# Patient Record
Sex: FEMALE | Race: BLACK OR AFRICAN AMERICAN | NOT HISPANIC OR LATINO | Employment: OTHER
[De-identification: names, ages, dates, MRNs, and addresses within clinical notes are randomized per-mention and may not be internally consistent; named-entity substitution may affect disease eponyms.]

---

## 2017-01-05 ENCOUNTER — SURGERY (OUTPATIENT)
Age: 82
End: 2017-01-05

## 2017-03-09 ENCOUNTER — SURGERY (OUTPATIENT)
Age: 82
End: 2017-03-09

## 2017-03-28 ENCOUNTER — SURGERY (OUTPATIENT)
Age: 82
End: 2017-03-28

## 2017-06-01 ENCOUNTER — SURGERY (OUTPATIENT)
Age: 82
End: 2017-06-01

## 2017-06-01 ENCOUNTER — HOSPITAL ENCOUNTER (OUTPATIENT)
Facility: OTHER | Age: 82
Discharge: HOME | End: 2017-06-01
Attending: INTERNAL MEDICINE | Admitting: INTERNAL MEDICINE
Payer: MEDICARE

## 2017-06-01 VITALS
TEMPERATURE: 98 F | HEIGHT: 59 IN | HEART RATE: 64 BPM | BODY MASS INDEX: 30.64 KG/M2 | SYSTOLIC BLOOD PRESSURE: 123 MMHG | RESPIRATION RATE: 16 BRPM | WEIGHT: 152 LBS | OXYGEN SATURATION: 97 % | DIASTOLIC BLOOD PRESSURE: 56 MMHG

## 2017-06-01 DIAGNOSIS — T82.599A MECHANICAL COMPLICATION OF VASCULAR DEVICE, INITIAL ENCOUNTER: ICD-10-CM

## 2017-06-01 DIAGNOSIS — T82.599A MECHANICAL COMPLICATION OF VASCULAR DEVICE: ICD-10-CM

## 2017-06-01 LAB — POCT GLUCOSE: 167 MG/DL (ref 70–110)

## 2017-06-01 PROCEDURE — C1894 INTRO/SHEATH, NON-LASER: HCPCS | Performed by: INTERNAL MEDICINE

## 2017-06-01 PROCEDURE — 27201059 HC S ENCORE INFLATION DEVICE: Performed by: INTERNAL MEDICINE

## 2017-06-01 PROCEDURE — 25000003 PHARM REV CODE 250: Performed by: INTERNAL MEDICINE

## 2017-06-01 PROCEDURE — C1769 GUIDE WIRE: HCPCS | Performed by: INTERNAL MEDICINE

## 2017-06-01 PROCEDURE — 99153 MOD SED SAME PHYS/QHP EA: CPT | Performed by: INTERNAL MEDICINE

## 2017-06-01 PROCEDURE — 25000003 PHARM REV CODE 250

## 2017-06-01 PROCEDURE — 63600175 PHARM REV CODE 636 W HCPCS: Performed by: INTERNAL MEDICINE

## 2017-06-01 PROCEDURE — 25500020 PHARM REV CODE 255

## 2017-06-01 PROCEDURE — 63600175 PHARM REV CODE 636 W HCPCS

## 2017-06-01 PROCEDURE — C1725 CATH, TRANSLUMIN NON-LASER: HCPCS | Performed by: INTERNAL MEDICINE

## 2017-06-01 PROCEDURE — C1757 CATH, THROMBECTOMY/EMBOLECT: HCPCS | Performed by: INTERNAL MEDICINE

## 2017-06-01 PROCEDURE — 99152 MOD SED SAME PHYS/QHP 5/>YRS: CPT | Performed by: INTERNAL MEDICINE

## 2017-06-01 PROCEDURE — 36905 THRMBC/NFS DIALYSIS CIRCUIT: CPT | Performed by: INTERNAL MEDICINE

## 2017-06-01 PROCEDURE — 27201224 HC CATH ANGIOGRAM: Performed by: INTERNAL MEDICINE

## 2017-06-01 RX ORDER — LIDOCAINE HYDROCHLORIDE 10 MG/ML
1 INJECTION, SOLUTION EPIDURAL; INFILTRATION; INTRACAUDAL; PERINEURAL ONCE
Status: COMPLETED | OUTPATIENT
Start: 2017-06-01 | End: 2017-06-01

## 2017-06-01 RX ORDER — MIDAZOLAM HYDROCHLORIDE 1 MG/ML
INJECTION, SOLUTION INTRAMUSCULAR; INTRAVENOUS
Status: DISCONTINUED | OUTPATIENT
Start: 2017-06-01 | End: 2017-06-01 | Stop reason: HOSPADM

## 2017-06-01 RX ORDER — FENTANYL CITRATE 50 UG/ML
INJECTION, SOLUTION INTRAMUSCULAR; INTRAVENOUS
Status: DISCONTINUED | OUTPATIENT
Start: 2017-06-01 | End: 2017-06-01 | Stop reason: HOSPADM

## 2017-06-01 RX ORDER — HEPARIN SODIUM 1000 [USP'U]/ML
INJECTION, SOLUTION INTRAVENOUS; SUBCUTANEOUS
Status: DISCONTINUED | OUTPATIENT
Start: 2017-06-01 | End: 2017-06-01 | Stop reason: HOSPADM

## 2017-06-01 RX ORDER — HEPARIN SOD,PORCINE/0.9 % NACL 1000/500ML
INTRAVENOUS SOLUTION INTRAVENOUS
Status: DISCONTINUED | OUTPATIENT
Start: 2017-06-01 | End: 2017-06-01 | Stop reason: HOSPADM

## 2017-06-01 RX ORDER — GLIPIZIDE 10 MG/1
10 TABLET, FILM COATED, EXTENDED RELEASE ORAL
COMMUNITY

## 2017-06-01 RX ADMIN — FENTANYL CITRATE 50 MCG: 50 INJECTION, SOLUTION INTRAMUSCULAR; INTRAVENOUS at 02:06

## 2017-06-01 RX ADMIN — LIDOCAINE HYDROCHLORIDE 5 ML: 10 INJECTION, SOLUTION EPIDURAL; INFILTRATION; INTRACAUDAL; PERINEURAL at 02:06

## 2017-06-01 RX ADMIN — ALTEPLASE 2 MG: 2.2 INJECTION, POWDER, LYOPHILIZED, FOR SOLUTION INTRAVENOUS at 01:06

## 2017-06-01 RX ADMIN — MIDAZOLAM HYDROCHLORIDE 1 MG: 1 INJECTION, SOLUTION INTRAMUSCULAR; INTRAVENOUS at 01:06

## 2017-06-01 RX ADMIN — HEPARIN SODIUM IN SODIUM CHLORIDE 500 ML: 200 INJECTION INTRAVENOUS at 01:06

## 2017-06-01 RX ADMIN — MIDAZOLAM HYDROCHLORIDE 1 MG: 1 INJECTION, SOLUTION INTRAMUSCULAR; INTRAVENOUS at 02:06

## 2017-06-01 RX ADMIN — HEPARIN SODIUM 5000 UNITS: 1000 INJECTION, SOLUTION INTRAVENOUS; SUBCUTANEOUS at 02:06

## 2017-06-01 RX ADMIN — FENTANYL CITRATE 50 MCG: 50 INJECTION, SOLUTION INTRAMUSCULAR; INTRAVENOUS at 01:06

## 2017-06-01 NOTE — PLAN OF CARE
Patient prefers to have son Jeff Pimentel present for discharge teaching. Please contact them @[phone].

## 2017-06-01 NOTE — PLAN OF CARE
Binta JONES Loren has met all discharge criteria from Phase II. Vital Signs are stable, ambulating  without difficulty. Discharge instructions given, patient verbalized understanding. Discharged from facility via wheelchair in stable condition.

## 2017-06-01 NOTE — DISCHARGE INSTRUCTIONS
NO HEAVY LIFTING FOR 72 HOURS      After the procedure:    · Your arm and hand will be checked to make sure blood is flowing through the fistula properly. The feeling of blood rushing through the fistula is called a thrill. It is somewhat similar to the purring of a cat. Youll be taught how to check for this feeling each day to make sure there are no problems with your fistula.   · Watch for bleeding-If bleeding occurs place light pressure on the area and call your physician.    Recovering at Home  Once at home, follow all of the instructions youve been given. Be sure to:  · Take all medications as directed.  · Care for your incision as instructed.  · Check for signs of infection at the incision site (see below).  · Avoid heavy lifting and strenuous activities as directed.  · Monitor and care for your fistula as instructed      Anesthesia: Monitored Anesthesia Care (MAC)    Anesthesia Safety  · Have an adult family member or friend drive you home after the procedure.  · For the first 24 hours after your surgery:  ¨ Do not drive or use heavy equipment.  ¨ Do not make important decisions or sign documents.  ¨ Avoid alcohol.  ¨ Have someone stay with you, if possible. They can watch for problems and help keep you safe.    PLEASE FOLLOW ANY OTHER INSTRUCTIONS PROVIDED TO YOU BY DR. POSADA!

## 2017-06-01 NOTE — H&P
[facility]  History & Physical    Subjective:      Chief Complaint/Reason for Admission: Declot of LUE AVF.    Binta Pimentel is a 83 y.o. female with ESRD (MWF at [facility] with Dr. Gomes), comes in with clotted access.  She had superficialization with Dr. Marroquin at the end of January.    Past Medical History:   Diagnosis Date    Diabetes mellitus     Hypertension     Kidney disease      History reviewed. No pertinent surgical history.  History reviewed. No pertinent family history.  Social History   Substance Use Topics    Smoking status: Never Smoker    Smokeless tobacco: Not on file    Alcohol use Not on file       PTA Medications   Medication Sig    furosemide (LASIX) 20 MG tablet Take 20 mg by mouth 2 (two) times daily.    glipiZIDE (GLUCOTROL) 10 MG TR24 Take 10 mg by mouth daily with breakfast.    hydrochlorothiazide (MICROZIDE) 12.5 mg capsule Take 12.5 mg by mouth once daily.    labetalol (NORMODYNE) 300 MG tablet Take 300 mg by mouth 2 (two) times daily.    lisinopril (PRINIVIL,ZESTRIL) 40 MG tablet Take 40 mg by mouth once daily.    simvastatin (ZOCOR) 40 MG tablet Take 40 mg by mouth every evening.     Review of patient's allergies indicates:  No Known Allergies     Review of Systems   Constitutional: Negative.    Respiratory: Negative.    Cardiovascular: Negative.        Objective:      Vital Signs (Most Recent)  Temp: 98.1 °F (36.7 °C) (06/01/17 1019)  Pulse: 65 (06/01/17 1019)  Resp: 16 (06/01/17 1019)  BP: 124/60 (06/01/17 1019)  SpO2: 97 % (06/01/17 1019)    Vital Signs Range (Last 24H):  Temp:  [98.1 °F (36.7 °C)]   Pulse:  [65]   Resp:  [16]   BP: (124)/(60)   SpO2:  [97 %]     Physical Exam   Constitutional: She is oriented to person, place, and time. She appears well-developed and well-nourished. No distress.   Pulmonary/Chest: Effort normal. No respiratory distress.   Musculoskeletal:   LUE AVF with graft portion, no thrill.  L radial pulse diminished.    Neurological: She is alert and oriented to person, place, and time.   Skin: She is not diaphoretic.   Psychiatric: She has a normal mood and affect. Her behavior is normal.         Assessment:      Active Hospital Problems    Diagnosis  POA    Mechanical complication of vascular device [T89.470M]  Yes      Resolved Hospital Problems    Diagnosis Date Resolved POA   No resolved problems to display.       Plan:    Declot today.

## 2017-06-01 NOTE — BRIEF OP NOTE
[facility]  Brief Operative Note     SUMMARY     Surgery Date: 6/1/2017     Surgeon(s) and Role:     * Dereck Wilson MD - Primary    Assisting Surgeon: Neema Munoz MD; Karlos Jackson MD    Pre-op Diagnosis:  ESRD (end stage renal disease) on dialysis [N18.6, Z99.2]    Post-op Diagnosis:  Post-Op Diagnosis Codes:     * ESRD (end stage renal disease) on dialysis [N18.6, Z99.2]    Procedure(s) (LRB):  DECLOT- AV FISTULA/GRAFT (N/A)    Anesthesia: 1% lidocaine, 2mg versed, 100mcg fentanyl    Description of the findings of the procedure: Patient was prepped and draped in a sterile fashion.  This was a successful declot of her LUE AV fistula although please see note for full details and recommendations.  Patient tolerated the procedure well with no immediate complications, although she did develop a small hematoma.    Findings/Key Components: Patient was prepped and draped in a sterile fashion.  This was a successful declot of her LUE AV fistula although please see note for full details and recommendations.  Patient tolerated the procedure well with no immediate complications, although she did develop a small hematoma.    Estimated Blood Loss: 20ml         Specimens:   Specimen (12h ago through future)    None          Discharge Note    SUMMARY     Admit Date: 6/1/2017    Discharge Date and Time:  06/01/2017 2:50 PM    Hospital Course (synopsis of major diagnoses, care, treatment, and services provided during the course of the hospital stay): Patient was prepped and draped in a sterile fashion.  This was a successful declot of her LUE AV fistula although please see note for full details and recommendations.  Patient tolerated the procedure well with no immediate complications, although she did develop a small hematoma.       Final Diagnosis: Post-Op Diagnosis Codes:     * ESRD (end stage renal disease) on dialysis [N18.6, Z99.2]    Disposition: Home or Self Care    Follow Up/Patient Instructions:      Medications:  Reconciled Home Medications:   Current Discharge Medication List      CONTINUE these medications which have NOT CHANGED    Details   furosemide (LASIX) 20 MG tablet Take 20 mg by mouth 2 (two) times daily.      glipiZIDE (GLUCOTROL) 10 MG TR24 Take 10 mg by mouth daily with breakfast.      hydrochlorothiazide (MICROZIDE) 12.5 mg capsule Take 12.5 mg by mouth once daily.      labetalol (NORMODYNE) 300 MG tablet Take 300 mg by mouth 2 (two) times daily.      lisinopril (PRINIVIL,ZESTRIL) 40 MG tablet Take 40 mg by mouth once daily.      simvastatin (ZOCOR) 40 MG tablet Take 40 mg by mouth every evening.             Discharge Procedure Orders  Activity as tolerated     Lifting restrictions   Order Comments: No heavy lifting for 72 hours.     Call MD for:  temperature >100.4     Call MD for:  severe uncontrolled pain     Call MD for:  redness, tenderness, or signs of infection (pain, swelling, redness, odor or green/yellow discharge around incision site)     Call MD for:   Order Comments: Bleeding from the access site.       Follow-up Information     Go to [facility].    Why:  For dialysis  Contact information:  [address]  [facility] [address] [phone]

## 2017-06-01 NOTE — PROCEDURES
hospitals Interventional Nephrology Procedure Report    Date of Procedure: 06/01/2017     :  Dereck Wilson MD  Assistant: Neema Munoz MD; Karlos Jackson MD     Indication for Procedure:  Thrombosed LUE brachiocephalic AV fistula      Referring Provider:  [facility] dialysis unit and Dr. Gomes     Procedures Performed:  1.  Access cannulation  2.  Second cannulation  3.  Venous administration of TPA  4.  Venous angioplasty  5.  Percutaneous thrombectomy  6.  Arteriogram  7.  Moderate conscious sedation     Medications Administered:  1.  2 mg of tPA IV  2.  Heparin 5000 units IV  3.  Versed 2 mg IV  4.  Fentanyl 100 mcg IV     Blood Loss:  Approximately 20 mL     Contrast Used:  Approximately 27 mL     Procedure in Detail:    After informed consent was obtained, the patient was prepped and draped in the usual sterile fashion.  Her LUE brachiocephalic AV fistula was cannulated in the venous facing direction with a micropuncture set.  The microwire was confirmed to be in correct location under fluoroscopy and a 4-[place] micro sheath was established.  A glidewire was advanced easily to the central circulation and the micro sheath was removed.  A 6-[place] sheath was established.  Then, an over-the-wire Berenstein guiding catheter was advanced to the central circulation and a central venogram was performed, which revealed patent central vasculature.  After the patient was reevaluated with the physician, moderate sedation was then administered.  A pullback venogram was performed, which revealed stenosis beginning at the distal portion of her cephalic arch.  So, the wire was reinserted and the Berenstein catheter was removed.  Then, 2 mg of TPA was injected into the venous facing sheath.  Following this, a 7x80 mm Conquest PTA balloon was advanced to the cephalic arch and was then inflated to full effacement with the waist seen on the balloon prior to full effacement.  Angioplasty was repeated  back within the   graft to macerate the thrombus.  The balloon was then removed and a second cannulation was made in the arterial facing direction with the micropuncture set.  Again, the microwire confirmed correct location under fluoroscopy.  Microsheath established and a glidewire was advanced across the arterial anastomosis and down into the radial artery and then the micro sheath was removed and a 6-Citizen of [place] sheath was established.  Then using an over-the-wire technique, the Alexia catheter was readvanced across the arterial anastomosis, inflated and pullback maneuver was performed with significant thrombus aspirated through the arterial facing   sheath.  This procedure was repeated approximately 5 times after which there was noted to be weak pulsatility of the AV fistula.  We then took the Alexia catheter on the venous facing wire and swept towards the central circulation to sweep out any residual thrombus and there was still weak pulsatility flow felt up the AV fistula.  So at this point, the Fogerty catheter advanced across the arterial anastomosis and an arteriogram was performed.  At least 6 cm of the brachial artery was seen passing down into the forearm and passed the bifurcation into the radial and ulnar arteries and no distal emboli were seen.      The arterial facing guidewire and sheath were then removed and hemostasis was achieved using a #2 Ethilon suture.  We then administered 5000 units of IV heparin and a repeat fistulogram was performed, which revealed brisk flow through the lower portion of the AV fistula with some residual significant stenosis seen near the venous-facing sheath, so the 7x80 mm conquest PTA balloon was advanced over the wire to this proximal venous fistular lesion and the balloon was inflated to full effacement with waist seen.  So at this point, we reevaluated the patient again as she was having some pain and an additional 1 mg of Versed and 50 mcg of fentanyl were  administered.  Repeat angiogram following the angioplasty revealed some improvement in the outflow stenosis and minimal extravasation of contrast seen.  We readvanced the same balloon to the site of extravasation and inflated to nominal pressure (12atm) and held for 30 seconds.  A follow up venogram revealed no further extravasation.  Hemostasis was achieved using a #2 Ethilon suture after the venous-facing sheath was pulled.  After the procedure, it was determined with ultrasound that a small (3x5cm) hematoma developed around the proximal portion of the fistula, which remained stable in size.  We additionally used doppler/pulse wave to determine the blood flow rate to be less than 200ml/min.     IMPRESSION AND PLAN:  This is a successful percutaneous thrombectomy using   mechanical and pharmacologic thrombolysis.  We will have the patient return in 1   week for suture removal and a followup ultrasound.      We would recommend holding off from using this fistula for 2 weeks (and to use the tunneled catheter she has in place), and allow the small hematoma to resolve and potentially for the blood flow through the AV fistula to improve.  As is, the flow which we were able to reestablish was less than ideal, but do expect this to improve some as the flow through the venous side continues and the hematoma resolved.      Dereck Wilson MD  [phone]

## 2017-06-08 ENCOUNTER — SURGERY (OUTPATIENT)
Age: 82
End: 2017-06-08

## 2017-06-12 NOTE — PLAN OF CARE
06/12/17 1613   ED Admissions Case Approval   ED Admissions Case Approval (!) CM Approved  (OP )

## 2017-06-13 ENCOUNTER — ANESTHESIA EVENT (OUTPATIENT)
Dept: SURGERY | Facility: OTHER | Age: 82
End: 2017-06-13
Payer: MEDICARE

## 2017-06-13 ENCOUNTER — HOSPITAL ENCOUNTER (OUTPATIENT)
Dept: PREADMISSION TESTING | Facility: OTHER | Age: 82
Discharge: HOME | End: 2017-06-13
Attending: SPECIALIST
Payer: MEDICARE

## 2017-06-13 VITALS
WEIGHT: 156 LBS | SYSTOLIC BLOOD PRESSURE: 125 MMHG | BODY MASS INDEX: 31.45 KG/M2 | HEART RATE: 65 BPM | OXYGEN SATURATION: 95 % | HEIGHT: 59 IN | TEMPERATURE: 98 F | DIASTOLIC BLOOD PRESSURE: 61 MMHG

## 2017-06-13 DIAGNOSIS — Z01.818 PREOP TESTING: ICD-10-CM

## 2017-06-13 LAB
ANION GAP SERPL CALC-SCNC: 16 MMOL/L
BUN SERPL-MCNC: 31 MG/DL
CALCIUM SERPL-MCNC: 8.9 MG/DL
CHLORIDE SERPL-SCNC: 97 MMOL/L
CO2 SERPL-SCNC: 25 MMOL/L
CREAT SERPL-MCNC: 4.1 MG/DL
EST. GFR  (AFRICAN AMERICAN): 11 ML/MIN/1.73 M^2
EST. GFR  (NON AFRICAN AMERICAN): 9 ML/MIN/1.73 M^2
GLUCOSE SERPL-MCNC: 236 MG/DL
HCT VFR BLD AUTO: 32.5 %
HGB BLD-MCNC: 10.9 G/DL
POTASSIUM SERPL-SCNC: 3.3 MMOL/L
SODIUM SERPL-SCNC: 138 MMOL/L

## 2017-06-13 PROCEDURE — 93010 ELECTROCARDIOGRAM REPORT: CPT | Mod: ,,, | Performed by: INTERNAL MEDICINE

## 2017-06-13 PROCEDURE — 36415 COLL VENOUS BLD VENIPUNCTURE: CPT

## 2017-06-13 PROCEDURE — 85018 HEMOGLOBIN: CPT

## 2017-06-13 PROCEDURE — 85014 HEMATOCRIT: CPT

## 2017-06-13 PROCEDURE — 93005 ELECTROCARDIOGRAM TRACING: CPT

## 2017-06-13 PROCEDURE — 80048 BASIC METABOLIC PNL TOTAL CA: CPT

## 2017-06-13 RX ORDER — NIFEDIPINE 60 MG/1
60 TABLET, EXTENDED RELEASE ORAL 2 TIMES DAILY
COMMUNITY

## 2017-06-13 RX ORDER — ERGOCALCIFEROL 1.25 MG/1
50000 CAPSULE ORAL
COMMUNITY

## 2017-06-13 RX ORDER — CINACALCET 30 MG/1
30 TABLET, FILM COATED ORAL
COMMUNITY

## 2017-06-13 RX ORDER — SODIUM CHLORIDE, SODIUM LACTATE, POTASSIUM CHLORIDE, CALCIUM CHLORIDE 600; 310; 30; 20 MG/100ML; MG/100ML; MG/100ML; MG/100ML
INJECTION, SOLUTION INTRAVENOUS CONTINUOUS
Status: CANCELLED | OUTPATIENT
Start: 2017-06-13

## 2017-06-13 NOTE — DISCHARGE INSTRUCTIONS
PRE-ADMIT TESTING -  [phone]    [address]  [facility]        OUTPATIENT SURGERY UNIT - [phone]    Your surgery has been scheduled at [facility]. We are pleased to have the opportunity to serve you. For Further Information please call [phone].    On the day of surgery please report to the Information Desk on the 1st floor.    · CONTACT YOUR PHYSICIAN'S OFFICE THE DAY PRIOR TO YOUR SURGERY TO OBTAIN YOUR ARRIVAL TIME.     · The evening before surgery do not eat anything after 9 p.m. ( this includes hard candy, chewing gum and mints).  You may only have GATORADE, POWERADE AND WATER  from 9 p.m. until you leave your home.   DO NOT DRINK ANY LIQUIDS ON THE WAY TO THE HOSPITAL.      SPECIAL MEDICATION INSTRUCTIONS: TAKE medications checked off by the Anesthesiologist on your Medication List.    Angiogram Patients: Take medications as instructed by your physician, including aspirin.     Surgery Patients:    If you take ASPIRIN - Your PHYSICIAN/SURGEON will need to inform you IF/OR when you need to stop taking aspirin prior to your surgery.     Do Not take any medications containing IBUPROFEN.  Do Not Wear any make-up or dark nail polish   (especially eye make-up) to surgery. If you come to surgery with makeup on you will be required to remove the makeup or nail polish.    Do not shave your surgical area at least 5 days prior to your surgery. The surgical prep will be performed at the hospital according to Infection Control regulations.    Leave all valuables at home.   Do Not wear any jewelry or watches, including any metal in body piercings.  Contact Lens must be removed before surgery. Either do not wear the contact lens or bring a case and solution for storage.  Please bring a container for eyeglasses or dentures as required.  Bring any paperwork your physician has provided, such as consent forms,  history and physicals, doctor's orders, etc.   Bring comfortable clothes  that are loose fitting to wear upon discharge. Take into consideration the type of surgery being performed.  Maintain your diet as advised per your physician the day prior to surgery.      Adequate rest the night before surgery is advised.   Park in the Parking lot behind the hospital or in the [place] [facility] across the street from the parking lot. Parking is complimentary.  If you will be discharged the same day as your procedure, please arrange for a responsible adult to drive you home or to accompany you if traveling by taxi.   YOU WILL NOT BE PERMITTED TO DRIVE OR TO LEAVE THE HOSPITAL ALONE AFTER SURGERY.   It is strongly recommended that you arrange for someone to remain with you for the first 24 hrs following your surgery.       Thank you for your cooperation.  The Staff of [facility].        Bathing Instructions                                                                 Please shower the evening before and morning of your procedure with    ANTIBACTERIAL SOAP. ( DIAL, etc )  Concentrate on the surgical area   for at least 3 minutes and rinse completely. Dry off as usual.   Do not use any deodorant, powder, body lotions, perfume, after shave or    cologne.

## 2017-06-13 NOTE — ANESTHESIA PREPROCEDURE EVALUATION
06/13/2017  Binta Pimentel is a 83 y.o., female.    Anesthesia Evaluation    I have reviewed the Patient Summary Reports.    I have reviewed the Nursing Notes.   I have reviewed the Medications.     Review of Systems  Anesthesia Hx:  No problems with previous Anesthesia    Social:  Non-Smoker    EENT/Dental:EENT/Dental Normal   Cardiovascular:   Exercise tolerance: poor Hypertension, well controlled    Pulmonary:  Pulmonary Normal    Renal/:   Chronic Renal Disease, ESRD, Dialysis    Neurological:  Neurology Normal    Endocrine:   Diabetes, well controlled, type 2        Physical Exam  General:  Well nourished    Airway/Jaw/Neck:  Airway Findings: Mouth Opening: Normal Tongue: Normal  General Airway Assessment: Adult  Mallampati: I  TM Distance: Normal, at least 6 cm  Jaw/Neck Findings:  Neck ROM: Normal ROM      Dental:  Dental Findings: Lower Dentures, Upper Dentures             Anesthesia Plan  Type of Anesthesia, risks & benefits discussed:  Anesthesia Type:  general  Patient's Preference:   Intra-op Monitoring Plan:   Intra-op Monitoring Plan Comments:   Post Op Pain Control Plan:   Post Op Pain Control Plan Comments:   Induction:   IV  Beta Blocker:         Informed Consent: Patient understands risks and agrees with Anesthesia plan.  Questions answered. Anesthesia consent signed with patient.  ASA Score: 3     Day of Surgery Review of History & Physical:    H&P update referred to the surgeon.         Ready For Surgery From Anesthesia Perspective.

## 2017-06-15 ENCOUNTER — ANESTHESIA (OUTPATIENT)
Dept: SURGERY | Facility: OTHER | Age: 82
End: 2017-06-15
Payer: MEDICARE

## 2017-06-15 ENCOUNTER — HOSPITAL ENCOUNTER (OUTPATIENT)
Facility: OTHER | Age: 82
Discharge: HOME | End: 2017-06-15
Attending: SPECIALIST | Admitting: SPECIALIST
Payer: MEDICARE

## 2017-06-15 VITALS
HEART RATE: 72 BPM | BODY MASS INDEX: 31.45 KG/M2 | DIASTOLIC BLOOD PRESSURE: 72 MMHG | HEIGHT: 59 IN | WEIGHT: 156 LBS | TEMPERATURE: 98 F | SYSTOLIC BLOOD PRESSURE: 128 MMHG | OXYGEN SATURATION: 97 % | RESPIRATION RATE: 16 BRPM

## 2017-06-15 DIAGNOSIS — Z99.2 ENCOUNTER FOR EXTRACORPOREAL DIALYSIS: ICD-10-CM

## 2017-06-15 DIAGNOSIS — N18.6 ESRD (END STAGE RENAL DISEASE): ICD-10-CM

## 2017-06-15 DIAGNOSIS — Z99.2 END STAGE RENAL DISEASE ON DIALYSIS: Primary | ICD-10-CM

## 2017-06-15 DIAGNOSIS — N18.6 END STAGE RENAL DISEASE ON DIALYSIS: Primary | ICD-10-CM

## 2017-06-15 LAB
ANION GAP SERPL CALC-SCNC: 13 MMOL/L
BUN SERPL-MCNC: 30 MG/DL
CALCIUM SERPL-MCNC: 9 MG/DL
CHLORIDE SERPL-SCNC: 100 MMOL/L
CO2 SERPL-SCNC: 24 MMOL/L
CREAT SERPL-MCNC: 3.8 MG/DL
EST. GFR  (AFRICAN AMERICAN): 12 ML/MIN/1.73 M^2
EST. GFR  (NON AFRICAN AMERICAN): 10 ML/MIN/1.73 M^2
GLUCOSE SERPL-MCNC: 185 MG/DL
POCT GLUCOSE: 113 MG/DL (ref 70–110)
POCT GLUCOSE: 194 MG/DL (ref 70–110)
POTASSIUM SERPL-SCNC: 3.3 MMOL/L
SODIUM SERPL-SCNC: 137 MMOL/L

## 2017-06-15 PROCEDURE — 25000003 PHARM REV CODE 250: Performed by: ANESTHESIOLOGY

## 2017-06-15 PROCEDURE — 25000003 PHARM REV CODE 250: Performed by: NURSE ANESTHETIST, CERTIFIED REGISTERED

## 2017-06-15 PROCEDURE — 63600175 PHARM REV CODE 636 W HCPCS: Performed by: SPECIALIST

## 2017-06-15 PROCEDURE — 71000016 HC POSTOP RECOV ADDL HR: Performed by: SPECIALIST

## 2017-06-15 PROCEDURE — 71000015 HC POSTOP RECOV 1ST HR: Performed by: SPECIALIST

## 2017-06-15 PROCEDURE — C1768 GRAFT, VASCULAR: HCPCS | Performed by: SPECIALIST

## 2017-06-15 PROCEDURE — 36000707: Performed by: SPECIALIST

## 2017-06-15 PROCEDURE — 25000003 PHARM REV CODE 250: Performed by: SPECIALIST

## 2017-06-15 PROCEDURE — C1757 CATH, THROMBECTOMY/EMBOLECT: HCPCS | Performed by: SPECIALIST

## 2017-06-15 PROCEDURE — 37000009 HC ANESTHESIA EA ADD 15 MINS: Performed by: SPECIALIST

## 2017-06-15 PROCEDURE — 82962 GLUCOSE BLOOD TEST: CPT | Performed by: SPECIALIST

## 2017-06-15 PROCEDURE — 36000706: Performed by: SPECIALIST

## 2017-06-15 PROCEDURE — 71000033 HC RECOVERY, INTIAL HOUR: Performed by: SPECIALIST

## 2017-06-15 PROCEDURE — 37000008 HC ANESTHESIA 1ST 15 MINUTES: Performed by: SPECIALIST

## 2017-06-15 PROCEDURE — 27201423 OPTIME MED/SURG SUP & DEVICES STERILE SUPPLY: Performed by: SPECIALIST

## 2017-06-15 PROCEDURE — 80048 BASIC METABOLIC PNL TOTAL CA: CPT

## 2017-06-15 PROCEDURE — 63600175 PHARM REV CODE 636 W HCPCS: Performed by: NURSE ANESTHETIST, CERTIFIED REGISTERED

## 2017-06-15 DEVICE — GRAFT PROPATEN 6 X 8 THIN WALL: Type: IMPLANTABLE DEVICE | Site: ARM | Status: FUNCTIONAL

## 2017-06-15 RX ORDER — ACETAMINOPHEN 325 MG/1
650 TABLET ORAL EVERY 8 HOURS PRN
Status: DISCONTINUED | OUTPATIENT
Start: 2017-06-15 | End: 2017-06-15 | Stop reason: HOSPADM

## 2017-06-15 RX ORDER — LIDOCAINE HCL/PF 100 MG/5ML
SYRINGE (ML) INTRAVENOUS
Status: DISCONTINUED | OUTPATIENT
Start: 2017-06-15 | End: 2017-06-15

## 2017-06-15 RX ORDER — ONDANSETRON 2 MG/ML
4 INJECTION INTRAMUSCULAR; INTRAVENOUS ONCE AS NEEDED
Status: DISCONTINUED | OUTPATIENT
Start: 2017-06-15 | End: 2017-06-15 | Stop reason: HOSPADM

## 2017-06-15 RX ORDER — ONDANSETRON 8 MG/1
8 TABLET, ORALLY DISINTEGRATING ORAL EVERY 8 HOURS PRN
Status: DISCONTINUED | OUTPATIENT
Start: 2017-06-15 | End: 2017-06-15 | Stop reason: HOSPADM

## 2017-06-15 RX ORDER — CEFAZOLIN SODIUM 2 G/50ML
2 SOLUTION INTRAVENOUS
Status: DISCONTINUED | OUTPATIENT
Start: 2017-06-15 | End: 2017-06-15 | Stop reason: HOSPADM

## 2017-06-15 RX ORDER — GLYCOPYRROLATE 0.2 MG/ML
INJECTION INTRAMUSCULAR; INTRAVENOUS
Status: DISCONTINUED | OUTPATIENT
Start: 2017-06-15 | End: 2017-06-15

## 2017-06-15 RX ORDER — FENTANYL CITRATE 50 UG/ML
25 INJECTION, SOLUTION INTRAMUSCULAR; INTRAVENOUS EVERY 5 MIN PRN
Status: DISCONTINUED | OUTPATIENT
Start: 2017-06-15 | End: 2017-06-15 | Stop reason: HOSPADM

## 2017-06-15 RX ORDER — SODIUM CHLORIDE, SODIUM LACTATE, POTASSIUM CHLORIDE, CALCIUM CHLORIDE 600; 310; 30; 20 MG/100ML; MG/100ML; MG/100ML; MG/100ML
INJECTION, SOLUTION INTRAVENOUS CONTINUOUS
Status: DISCONTINUED | OUTPATIENT
Start: 2017-06-15 | End: 2017-06-15 | Stop reason: HOSPADM

## 2017-06-15 RX ORDER — HEPARIN SODIUM 10000 [USP'U]/ML
INJECTION, SOLUTION INTRAVENOUS; SUBCUTANEOUS
Status: DISCONTINUED | OUTPATIENT
Start: 2017-06-15 | End: 2017-06-15 | Stop reason: HOSPADM

## 2017-06-15 RX ORDER — ACETAMINOPHEN 10 MG/ML
INJECTION, SOLUTION INTRAVENOUS
Status: DISCONTINUED | OUTPATIENT
Start: 2017-06-15 | End: 2017-06-15

## 2017-06-15 RX ORDER — SODIUM CHLORIDE 0.9 % (FLUSH) 0.9 %
3 SYRINGE (ML) INJECTION EVERY 8 HOURS
Status: DISCONTINUED | OUTPATIENT
Start: 2017-06-15 | End: 2017-06-15 | Stop reason: HOSPADM

## 2017-06-15 RX ORDER — ONDANSETRON HYDROCHLORIDE 2 MG/ML
INJECTION, SOLUTION INTRAMUSCULAR; INTRAVENOUS
Status: DISCONTINUED | OUTPATIENT
Start: 2017-06-15 | End: 2017-06-15

## 2017-06-15 RX ORDER — OXYCODONE HYDROCHLORIDE 5 MG/1
5 TABLET ORAL
Status: DISCONTINUED | OUTPATIENT
Start: 2017-06-15 | End: 2017-06-15 | Stop reason: HOSPADM

## 2017-06-15 RX ORDER — HYDROMORPHONE HYDROCHLORIDE 2 MG/ML
0.4 INJECTION, SOLUTION INTRAMUSCULAR; INTRAVENOUS; SUBCUTANEOUS EVERY 5 MIN PRN
Status: DISCONTINUED | OUTPATIENT
Start: 2017-06-15 | End: 2017-06-15 | Stop reason: HOSPADM

## 2017-06-15 RX ORDER — RAMELTEON 8 MG/1
8 TABLET ORAL NIGHTLY PRN
Status: DISCONTINUED | OUTPATIENT
Start: 2017-06-15 | End: 2017-06-15 | Stop reason: HOSPADM

## 2017-06-15 RX ORDER — DIPHENHYDRAMINE HYDROCHLORIDE 50 MG/ML
25 INJECTION INTRAMUSCULAR; INTRAVENOUS EVERY 4 HOURS PRN
Status: DISCONTINUED | OUTPATIENT
Start: 2017-06-15 | End: 2017-06-15 | Stop reason: HOSPADM

## 2017-06-15 RX ORDER — HYDROCODONE BITARTRATE AND ACETAMINOPHEN 5; 325 MG/1; MG/1
1 TABLET ORAL EVERY 4 HOURS PRN
Status: DISCONTINUED | OUTPATIENT
Start: 2017-06-15 | End: 2017-06-15 | Stop reason: HOSPADM

## 2017-06-15 RX ORDER — MEPERIDINE HYDROCHLORIDE 50 MG/ML
12.5 INJECTION INTRAMUSCULAR; INTRAVENOUS; SUBCUTANEOUS ONCE AS NEEDED
Status: DISCONTINUED | OUTPATIENT
Start: 2017-06-15 | End: 2017-06-15 | Stop reason: HOSPADM

## 2017-06-15 RX ORDER — FENTANYL CITRATE 50 UG/ML
INJECTION, SOLUTION INTRAMUSCULAR; INTRAVENOUS
Status: DISCONTINUED | OUTPATIENT
Start: 2017-06-15 | End: 2017-06-15

## 2017-06-15 RX ORDER — PROPOFOL 10 MG/ML
VIAL (ML) INTRAVENOUS
Status: DISCONTINUED | OUTPATIENT
Start: 2017-06-15 | End: 2017-06-15

## 2017-06-15 RX ORDER — PHENYLEPHRINE HYDROCHLORIDE 10 MG/ML
INJECTION INTRAVENOUS
Status: DISCONTINUED | OUTPATIENT
Start: 2017-06-15 | End: 2017-06-15

## 2017-06-15 RX ORDER — SODIUM CHLORIDE 0.9 % (FLUSH) 0.9 %
3 SYRINGE (ML) INJECTION
Status: DISCONTINUED | OUTPATIENT
Start: 2017-06-15 | End: 2017-06-15 | Stop reason: HOSPADM

## 2017-06-15 RX ADMIN — PROPOFOL 50 MG: 10 INJECTION, EMULSION INTRAVENOUS at 09:06

## 2017-06-15 RX ADMIN — LIDOCAINE HYDROCHLORIDE 50 MG: 20 INJECTION, SOLUTION INTRAVENOUS at 08:06

## 2017-06-15 RX ADMIN — PHENYLEPHRINE HYDROCHLORIDE 100 MCG: 10 INJECTION INTRAVENOUS at 10:06

## 2017-06-15 RX ADMIN — FENTANYL CITRATE 50 MCG: 50 INJECTION, SOLUTION INTRAMUSCULAR; INTRAVENOUS at 10:06

## 2017-06-15 RX ADMIN — SODIUM CHLORIDE, SODIUM LACTATE, POTASSIUM CHLORIDE, AND CALCIUM CHLORIDE: 600; 310; 30; 20 INJECTION, SOLUTION INTRAVENOUS at 06:06

## 2017-06-15 RX ADMIN — CEFAZOLIN SODIUM 2 G: 2 SOLUTION INTRAVENOUS at 08:06

## 2017-06-15 RX ADMIN — CARBOXYMETHYLCELLULOSE SODIUM 2 DROP: 2.5 SOLUTION/ DROPS OPHTHALMIC at 08:06

## 2017-06-15 RX ADMIN — PROPOFOL 100 MG: 10 INJECTION, EMULSION INTRAVENOUS at 08:06

## 2017-06-15 RX ADMIN — EPHEDRINE SULFATE 10 MG: 50 INJECTION INTRAMUSCULAR; INTRAVENOUS; SUBCUTANEOUS at 09:06

## 2017-06-15 RX ADMIN — EPHEDRINE SULFATE 10 MG: 50 INJECTION INTRAMUSCULAR; INTRAVENOUS; SUBCUTANEOUS at 10:06

## 2017-06-15 RX ADMIN — PHENYLEPHRINE HYDROCHLORIDE 100 MCG: 10 INJECTION INTRAVENOUS at 09:06

## 2017-06-15 RX ADMIN — FENTANYL CITRATE 50 MCG: 50 INJECTION, SOLUTION INTRAMUSCULAR; INTRAVENOUS at 09:06

## 2017-06-15 RX ADMIN — EPHEDRINE SULFATE 15 MG: 50 INJECTION INTRAMUSCULAR; INTRAVENOUS; SUBCUTANEOUS at 09:06

## 2017-06-15 RX ADMIN — GLYCOPYRROLATE 0.2 MG: 0.2 INJECTION, SOLUTION INTRAMUSCULAR; INTRAVENOUS at 08:06

## 2017-06-15 RX ADMIN — FENTANYL CITRATE 50 MCG: 50 INJECTION, SOLUTION INTRAMUSCULAR; INTRAVENOUS at 08:06

## 2017-06-15 RX ADMIN — ONDANSETRON 4 MG: 2 INJECTION, SOLUTION INTRAMUSCULAR; INTRAVENOUS at 08:06

## 2017-06-15 RX ADMIN — OXYCODONE HYDROCHLORIDE 5 MG: 5 TABLET ORAL at 12:06

## 2017-06-15 RX ADMIN — ACETAMINOPHEN 1000 MG: 10 INJECTION, SOLUTION INTRAVENOUS at 08:06

## 2017-06-15 RX ADMIN — EPHEDRINE SULFATE 5 MG: 50 INJECTION INTRAMUSCULAR; INTRAVENOUS; SUBCUTANEOUS at 09:06

## 2017-06-15 NOTE — TRANSFER OF CARE
"Anesthesia Transfer of Care Note    Patient: Dilley V Loren    Procedure(s) Performed: Procedure(s) (LRB):  [id number] AVF (Left)    Patient location: PACU    Anesthesia Type: general    Transport from OR: Transported from OR on 2-3 L/min O2 by NC with adequate spontaneous ventilation    Post pain: adequate analgesia    Post assessment: no apparent anesthetic complications    Post vital signs: stable    Level of consciousness: awake and alert    Nausea/Vomiting: no nausea/vomiting    Complications: none    Transfer of care protocol was followed      Last vitals:   Visit Vitals  /67 (BP Location: Right arm, Patient Position: Sitting, BP Method: Automatic)   Pulse 68   Temp 36.6 °C (97.9 °F) (Oral)   Resp 16   Ht 4' 11" (1.499 m)   Wt 70.8 kg (156 lb)   SpO2 96%   Breastfeeding? No   BMI 31.51 kg/m²     "

## 2017-06-15 NOTE — ANESTHESIA POSTPROCEDURE EVALUATION
"Anesthesia Post Evaluation    Patient: Binta V [place]    Procedure(s) Performed: Procedure(s) (LRB):  [id number]-[id number] AVF (Left)    Final Anesthesia Type: general  Patient location during evaluation: PACU  Patient participation: Yes- Able to Participate  Level of consciousness: awake and alert  Post-procedure vital signs: reviewed and stable  Pain management: adequate  Airway patency: patent  PONV status at discharge: No PONV  Anesthetic complications: no      Cardiovascular status: blood pressure returned to baseline  Respiratory status: unassisted  Hydration status: euvolemic  Follow-up not needed.        Visit Vitals  /64 (BP Location: Right arm, Patient Position: Lying, BP Method: Automatic)   Pulse 68   Temp 37.1 °C (98.8 °F) (Oral)   Resp 18   Ht 4' 11" (1.499 m)   Wt 70.8 kg (156 lb)   SpO2 96%   Breastfeeding? No   BMI 31.51 kg/m²       Pain/Graciela Score: Pain Assessment Performed: Yes (6/15/2017 12:14 PM)  Presence of Pain: complains of pain/discomfort (6/15/2017 12:13 PM)  Presence of Pain: complains of pain/discomfort (6/15/2017 12:13 PM)  Graciela Score: 10 (6/15/2017 12:13 PM)      "

## 2017-06-15 NOTE — PLAN OF CARE
Patient prefers to have  Jeff son present for discharge teaching. Please contact them @ [phone].

## 2017-06-15 NOTE — OP NOTE
[facility]  Brief Operative Note     SUMMARY     Surgery Date: 6/15/2017     Surgeon(s) and Role:     * Chucho Marroquin Jr., MD - Primary    Assisting Surgeon: None    Pre-op Diagnosis:  Encounter for extracorporeal dialysis [Z99.2]  End stage renal disease [N18.6]  AV graft malfunction, initial encounter [T82.590A]    Post-op Diagnosis:  Post-Op Diagnosis Codes:     * Encounter for extracorporeal dialysis [Z99.2]     * End stage renal disease [N18.6]     * AV graft malfunction, initial encounter [T82.590A]    Procedure(s) (LRB):  [id number]-LT AVF (Left)    Anesthesia: General    Description of the findings of the procedure:PTFE AVG, brachial a at antecubital fossa to axillary v    Findings/Key Components: adequate brachial v    Estimated Blood Loss: 100cc         Specimens:   Specimen (12h ago through future)    None          Discharge Note    SUMMARY     Admit Date: 6/15/2017    Discharge Date and Time:  06/15/2017 11:34 AM    Hospital Course (synopsis of major diagnoses, care, treatment, and services provided during the course of the hospital stay): b9       Final Diagnosis: Post-Op Diagnosis Codes:     * Encounter for extracorporeal dialysis [Z99.2]     * End stage renal disease [N18.6]     * AV graft malfunction, initial encounter [T82.590A]    Disposition: Home or Self Care    Follow Up/Patient Instructions:     Medications:  Reconciled Home Medications:   Current Discharge Medication List      CONTINUE these medications which have NOT CHANGED    Details   cinacalcet (SENSIPAR) 30 MG Tab Take 30 mg by mouth daily with breakfast.      ergocalciferol (VITAMIN D2) 50,000 unit Cap Take 50,000 Units by mouth every 30 days.      glipiZIDE (GLUCOTROL) 10 MG TR24 Take 10 mg by mouth daily with breakfast.      hydrochlorothiazide (HYDRODIURIL) 25 MG tablet Take 25 mg by mouth once daily.       labetalol (NORMODYNE) 300 MG tablet Take 300 mg by mouth 2 (two) times daily.      nifedipine  (PROCARDIA XL) 60 MG (OSM) 24 hr tablet Take 60 mg by mouth 2 (two) times daily.      simvastatin (ZOCOR) 40 MG tablet Take 40 mg by mouth every evening.             Discharge Procedure Orders  Diet general     Activity as tolerated     Call MD for:  severe uncontrolled pain     Call MD for:  redness, tenderness, or signs of infection (pain, swelling, redness, odor or green/yellow discharge around incision site)     Leave dressing on - Keep it clean, dry, and intact until clinic visit       Follow-up Information     Follow up In 1 week.

## 2017-06-15 NOTE — H&P (VIEW-ONLY)
"History & Physical    SUBJECTIVE:     History of Present Illness:  Patient is a 83 y.o. female s/p cephalic vein AVF , multiple thromboses , percutaneous methods unsuccessful.    Review of patient's allergies indicates:  No Known Allergies    Current Outpatient Prescriptions   Medication Sig Dispense Refill    furosemide (LASIX) 20 MG tablet Take 20 mg by mouth 2 (two) times daily.      glipiZIDE (GLUCOTROL) 10 MG TR24 Take 10 mg by mouth daily with breakfast.      hydrochlorothiazide (MICROZIDE) 12.5 mg capsule Take 12.5 mg by mouth once daily.      labetalol (NORMODYNE) 300 MG tablet Take 300 mg by mouth 2 (two) times daily.      lisinopril (PRINIVIL,ZESTRIL) 40 MG tablet Take 40 mg by mouth once daily.      simvastatin (ZOCOR) 40 MG tablet Take 40 mg by mouth every evening.       No current facility-administered medications for this visit.        Past Medical History:   Diagnosis Date    Diabetes mellitus     Hypertension     Kidney disease      History reviewed. No pertinent surgical history.  History reviewed. No pertinent family history.  Social History   Substance Use Topics    Smoking status: Never Smoker    Smokeless tobacco: Not on file    Alcohol use Not on file        Review of Systems:  Review of Systems   HENT: Negative.    Respiratory: Negative.    Cardiovascular: Negative.    Gastrointestinal: Negative.    Genitourinary: Negative.    Musculoskeletal: Negative.    Skin: Negative.        OBJECTIVE:     Vital Signs (Most Recent)  BP: 131/84 (06/08/17 1314)  4' 11" (1.499 m)  68.9 kg (152 lb)     Physical Exam:  Physical Exam   Constitutional: She is oriented to person, place, and time. She appears well-developed and well-nourished.   HENT:   Head: Normocephalic and atraumatic.   Eyes: EOM are normal.   Neck: Normal range of motion. Neck supple.   Cardiovascular: Normal rate, regular rhythm and normal heart sounds.    Pulmonary/Chest: Effort normal and breath sounds normal.   Abdominal: Soft. " Bowel sounds are normal.   Musculoskeletal: Normal range of motion.   AVF LUE thrombosed along entire length    Neurological: She is alert and oriented to person, place, and time.   Skin: Skin is warm and dry.   Psychiatric: She has a normal mood and affect.       Laboratory      Diagnostic Results:      ASSESSMENT/PLAN:   ESRD      PLAN:Plan     Revision/Placement new AVF

## 2017-06-15 NOTE — DISCHARGE INSTRUCTIONS
Creating a Hemodialysis Access    Before hemodialysis can be done, an access (way for blood to leave and return to your body) is needed. During hemodialysis, needles placed into the access carry blood to and from the dialyzer. A hemodialysis access is usually created in your arm. The two main types of accesses are an arteriovenous fistula (AV fistula) and an arteriovenous graft (AV graft).    Creating your access  The hemodialysis access provides a large volume of rapidly flowing blood. It involves a surgical operation under anesthesia. You may be able to go home the same day. It is made during a short procedure using one of these methods:    · A fistula is made by connecting an artery to a nearby vein. The high pressure and blood flow in the artery are transferred into the vein and help it grow in size and thickness. This enlarged vein (fistula) eventually has high blood flow and is thick enough for needles to be placed safely several times each week during hemodialysis. It may need weeks or months to develop before it's ready to be used. A fistula is more efficient than the graft and has fewer long-term problems. Therefore, it is the preferred form of access.  · A graft (piece of synthetic tube) may be sewn between an artery and a vein if a fistula is not possible because of the small size of your veins. Blood flows rapidly through the graft from the artery to the vein. A graft is usually ready to use in a few weeks. Needles can be placed into the plastic tube to obtain blood during dialysis.    Both types of access may take weeks to months before they can be used. If dialysis is necessary immediately, a temporary venous catheter is used. A catheter that allows two way blood flow is placed into a large vein and the dialysis tubing is connected to the catheter. If both the AV fistula and graft are unsuccessful, a more permanent venous catheter is used.  The most common complications for hemodialysis access are  infection, clotting and decreased blood flow from clotting or other narrowing.      Discharge Instructions: After Your Surgery  Youve just had surgery. During surgery you were given medicine called anesthesia to keep you relaxed and free of pain. After surgery you may have some pain or nausea. This is common. Here are some tips for feeling better and getting well after surgery.     Stay on schedule with your medication.     Going home  Your doctor or nurse will show you how to take care of yourself when you go home. He or she will also answer your questions. Have an adult family member or friend drive you home. For the first 24 hours after your surgery:    · Do not drive or use heavy equipment.  · Do not make important decisions or sign legal papers.  · Do not drink alcohol.  · Have someone stay with you, if needed. He or she can watch for problems and help keep you safe.    Be sure to go to all follow-up visits with your doctor. And rest after your surgery for as long as your doctor tells you to.    Coping with pain  If you have pain after surgery, pain medicine will help you feel better. Take it as told, before pain becomes severe. Also, ask your doctor or pharmacist about other ways to control pain. This might be with heat, ice, or relaxation. And follow any other instructions your surgeon or nurse gives you.    Tips for taking pain medicine  To get the best relief possible, remember these points:    · Pain medicines can upset your stomach. Taking them with a little food may help.  · Most pain relievers taken by mouth need at least 20 to 30 minutes to start to work.  · Taking medicine on a schedule can help you remember to take it. Try to time your medicine so that you can take it before starting an activity. This might be before you get dressed, go for a walk, or sit down for dinner.  · Constipation is a common side effect of pain medicines. Call your doctor before taking any medicines such as laxatives or  stool softeners to help ease constipation. Also ask if you should skip any foods. Drinking lots of fluids and eating foods such as fruits and vegetables that are high in fiber can also help. Remember, do not take laxatives unless your surgeon has prescribed them.  · Drinking alcohol and taking pain medicine can cause dizziness and slow your breathing. It can even be deadly. Do not drink alcohol while taking pain medicine.  · Pain medicine can make you react more slowly to things. Do not drive or run machinery while taking pain medicine.    Your health care provider may tell you to take acetaminophen to help ease your pain. Ask him or her how much you are supposed to take each day. Acetaminophen or other pain relievers may interact with your prescription medicines or other over-the-counter (OTC) drugs. Some prescription medicines have acetaminophen and other ingredients. Using both prescription and OTC acetaminophen for pain can cause you to overdose. Read the labels on your OTC medicines with care. This will help you to clearly know the list of ingredients, how much to take, and any warnings. It may also help you not take too much acetaminophen. If you have questions or do not understand the information, ask your pharmacist or health care provider to explain it to you before you take the OTC medicine.    Managing nausea  Some people have an upset stomach after surgery. This is often because of anesthesia, pain, or pain medicine, or the stress of surgery. These tips will help you handle nausea and eat healthy foods as you get better. If you were on a special food plan before surgery, ask your doctor if you should follow it while you get better. These tips may help:    · Do not push yourself to eat. Your body will tell you when to eat and how much.  · Start off with clear liquids and soup. They are easier to digest.  · Next try semi-solid foods, such as mashed potatoes, applesauce, and gelatin, as you feel  ready.  · Slowly move to solid foods. Dont eat fatty, rich, or spicy foods at first.  · Do not force yourself to have 3 large meals a day. Instead eat smaller amounts more often.  · Take pain medicines with a small amount of solid food, such as crackers or toast, to avoid nausea.     Call your surgeon if  · You still have pain an hour after taking medicine. The medicine may not be strong enough.  · You feel too sleepy, dizzy, or groggy. The medicine may be too strong.  · You have side effects like nausea, vomiting, or skin changes, such as rash, itching, or hives.       If you have obstructive sleep apnea  You were given anesthesia medicine during surgery to keep you comfortable and free of pain. After surgery, you may have more apnea spells because of this medicine and other medicines you were given. The spells may last longer than usual.   At home:    · Keep using the continuous positive airway pressure (CPAP) device when you sleep. Unless your health care provider tells you not to, use it when you sleep, day or night. CPAP is a common device used to treat obstructive sleep apnea.  · Talk with your provider before taking any pain medicine, muscle relaxants, or sedatives. Your provider will tell you about the possible dangers of taking these medicines.    © 4005-3492 [employer] [address] [place] [address], [place] [address]. All rights reserved. This information is not intended as a substitute for professional medical care. Always follow your healthcare professional's instructions.    PLEASE FOLLOW ANY OTHER INSTRUCTIONS PROVIDED TO YOU BY DR. ALFORD!

## 2017-06-15 NOTE — OP NOTE
DATE OF PROCEDURE:  06/15/2017    PREOPERATIVE DIAGNOSIS:  End-stage renal disease.    POSTOPERATIVE DIAGNOSIS:  End-stage renal disease.    PROCEDURE:  Construction  Gortex fistula left upper extremity ,brachial artery at   antecubital fossa to the axillary vein.    PROCEDURE IN DETAIL:  The patient was brought to the Operating Room, placed in   supine position.  Left upper extremity prepped and draped in normal sterile   fashion.  A longitudinal incision made near the axilla, carried down through the   skin and subcutaneous tissues.  The axillary vein was identified and isolated.    The brachial artery was then isolated at the antecubital fossa.  A 6-mm   Rentz-Juan Manuel graft was then brought through a subcutaneous tissue tunnel.  A   longitudinal venotomy made.  The graft sewn end-to-side to the graft with   running #6 Rentz-Juan Manuel suture.  A longitudinal arteriotomy made in the brachial   artery at the antecubital fossa.  The vein cut to an appropriate size and   configuration, sutured end-to-side to the artery with running #6 Rentz-Juan Manuel   suture.  Heparinized saline was infused in the vessels prior to occluding them.    After hemostasis had been obtained, the subcutaneous was closed with   interrupted 3-0 Vicryl, the skin with running 4-0 Monocryl.  The patient   tolerated the procedure well and left the Operating Room in good condition.      MARTA  dd: 06/15/2017 11:36:49 (CDT)  td: 06/15/2017 14:35:55 (MARTIN)  Doc ID   #[id number]  Job ID #[id number]    CC:

## 2017-06-15 NOTE — INTERVAL H&P NOTE
The patient has been examined and the H&P has been reviewed:    I concur with the findings and no changes have occurred since H&P was written.              Active Hospital Problems    Diagnosis  POA    End stage renal disease on dialysis [N18.6, Z99.2]  Not Applicable      Resolved Hospital Problems    Diagnosis Date Resolved POA   No resolved problems to display.

## 2017-08-03 ENCOUNTER — SURGERY (OUTPATIENT)
Age: 82
End: 2017-08-03

## 2017-08-03 ENCOUNTER — HOSPITAL ENCOUNTER (OUTPATIENT)
Facility: OTHER | Age: 82
Discharge: HOME | End: 2017-08-03
Attending: INTERNAL MEDICINE | Admitting: INTERNAL MEDICINE
Payer: MEDICARE

## 2017-08-03 VITALS
OXYGEN SATURATION: 96 % | BODY MASS INDEX: 30.64 KG/M2 | WEIGHT: 152 LBS | RESPIRATION RATE: 18 BRPM | SYSTOLIC BLOOD PRESSURE: 121 MMHG | DIASTOLIC BLOOD PRESSURE: 64 MMHG | HEART RATE: 64 BPM | HEIGHT: 59 IN | TEMPERATURE: 98 F

## 2017-08-03 DIAGNOSIS — N18.6 ESRD (END STAGE RENAL DISEASE): ICD-10-CM

## 2017-08-03 PROCEDURE — 36589 REMOVAL TUNNELED CV CATH: CPT | Performed by: INTERNAL MEDICINE

## 2017-08-03 PROCEDURE — 25000003 PHARM REV CODE 250

## 2017-08-03 RX ORDER — LIDOCAINE HYDROCHLORIDE 10 MG/ML
INJECTION INFILTRATION; PERINEURAL
Status: DISCONTINUED | OUTPATIENT
Start: 2017-08-03 | End: 2017-08-03 | Stop reason: HOSPADM

## 2017-08-03 RX ADMIN — LIDOCAINE HYDROCHLORIDE 10 ML: 10 INJECTION INFILTRATION; PERINEURAL at 10:08

## 2017-08-03 NOTE — PROCEDURES
[facility]ph[facility]     Tunneled Dialysis Catheter Removal Procedure Note     Date of Procedure: 08/03/2017    Primary Surgeon: Dereck Wilson MD  Assist: Naveen Sheriff MD    Procedure Performed:  1. Tunneled Dialysis Catheter Removal    Indication: catheter not required; functioning AV Access     Location of catheter: R internal jugular tunneled dialysis catheter     Patient was prepped and draped in sterile fashion. 1% lidocaine was used for local anesthesia. Blunt hemostat was used to dissect exit site and fibrin sheath from catheter cuff. Once cuff was freed, catheter was pulled and pressure applied to venotomy. Hemostasis was achieved, tegaderm dressing applied.     Blood Loss: <10ml  Discharge instructions given: verbal and printed.      Dereck Wilson MD

## 2017-08-03 NOTE — H&P
[facility]  History & Physical    Subjective:      Chief Complaint/Reason for Admission: Tunneled catheter removal    Binta Pimentel is a 83 y.o. female with ESRD (at [facility] with Dr. Gomes) who presents for tunneled dialysis catheter.  She had a declot of her LUE BB AVF 2 months ago, and her access has been working fine since.    Past Medical History:   Diagnosis Date    Diabetes mellitus     Hypertension     Kidney disease      Past Surgical History:   Procedure Laterality Date    CENTRAL VENOUS CATHETER INSERTION      HYSTERECTOMY       History reviewed. No pertinent family history.  Social History   Substance Use Topics    Smoking status: Former Smoker     Packs/day: 0.50     Years: 3.00     Quit date: 1977    Smokeless tobacco: Never Used    Alcohol use No       PTA Medications   Medication Sig    cinacalcet (SENSIPAR) 30 MG Tab Take 30 mg by mouth daily with breakfast.    ergocalciferol (VITAMIN D2) 50,000 unit Cap Take 50,000 Units by mouth every 30 days.    glipiZIDE (GLUCOTROL) 10 MG TR24 Take 10 mg by mouth daily with breakfast.    hydrochlorothiazide (HYDRODIURIL) 25 MG tablet Take 25 mg by mouth once daily.     labetalol (NORMODYNE) 300 MG tablet Take 300 mg by mouth 2 (two) times daily.    nifedipine (PROCARDIA XL) 60 MG (OSM) 24 hr tablet Take 60 mg by mouth 2 (two) times daily.    simvastatin (ZOCOR) 40 MG tablet Take 40 mg by mouth every evening.     Review of patient's allergies indicates:  No Known Allergies     Review of Systems   Constitutional: Negative.    Respiratory: Negative.    Cardiovascular: Negative.        Objective:      Vital Signs (Most Recent)  Temp: 97.9 °F (36.6 °C) (08/03/17 0907)  Pulse: 62 (08/03/17 0915)  Resp: 18 (08/03/17 0915)  BP: (!) 112/52 (08/03/17 0915)  SpO2: (!) 94 % (08/03/17 0915)    Vital Signs Range (Last 24H):  Temp:  [97.9 °F (36.6 °C)]   Pulse:  [62]   Resp:  [18]   BP: (112-122)/(52-66)   SpO2:  [94 %-99 %]      Physical Exam   Constitutional: She is oriented to person, place, and time. She appears well-developed and well-nourished. No distress.   Cardiovascular:   R chest tunneled dialysis catheter in place, no erythema, tenderness, swelling, drainage.   Pulmonary/Chest: Effort normal. No respiratory distress.   Neurological: She is alert and oriented to person, place, and time.   Skin: She is not diaphoretic.   Psychiatric: She has a normal mood and affect.       Assessment:      Active Hospital Problems    Diagnosis  POA    ESRD (end stage renal disease) [N18.6]  Yes      Resolved Hospital Problems    Diagnosis Date Resolved POA   No resolved problems to display.       Plan:      TDC removal today.

## 2017-08-03 NOTE — DISCHARGE INSTRUCTIONS

## 2017-08-03 NOTE — BRIEF OP NOTE
[facility], [place], operated by [facility]  Brief Operative Note     SUMMARY     Surgery Date: 8/3/2017     Surgeon(s) and Role:     * Dereck Wilson MD - Primary    Assisting Surgeon: Naveen Sheriff MD    Pre-op Diagnosis:  ESRD (end stage renal disease) on dialysis [N18.6, Z99.2]    Post-op Diagnosis:  Post-Op Diagnosis Codes:     * ESRD (end stage renal disease) on dialysis [N18.6, Z99.2]    Procedure(s) (LRB):  PERMCATH REMOVAL-TUNNELED CVC REMOVAL (N/A)    Anesthesia: Local, 1% lidocaine    Description of the findings of the procedure: Patient was prepped and draped in a sterile fashion.  This was a successful removal of her R IJ tunneled dialysis catheter.  Patient tolerated the procedure well and no immediate complications noted.      Findings/Key Components: Patient was prepped and draped in a sterile fashion.  This was a successful removal of her R IJ tunneled dialysis catheter.  Patient tolerated the procedure well and no immediate complications noted.      Estimated Blood Loss: <10ml         Specimens:   Specimen (12h ago through future)    None          Discharge Note    SUMMARY     Admit Date: 8/3/2017    Discharge Date and Time:  08/03/2017 10:30 AM    Hospital Course (synopsis of major diagnoses, care, treatment, and services provided during the course of the hospital stay): Patient was prepped and draped in a sterile fashion.  This was a successful removal of her R IJ tunneled dialysis catheter.  Patient tolerated the procedure well and no immediate complications noted.       Final Diagnosis: Post-Op Diagnosis Codes:     * ESRD (end stage renal disease) on dialysis [N18.6, Z99.2]    Disposition: Home or Self Care    Follow Up/Patient Instructions:     Medications:  Reconciled Home Medications:   Current Discharge Medication List      CONTINUE these medications which have NOT CHANGED    Details   cinacalcet (SENSIPAR) 30 MG Tab Take 30 mg by mouth daily with breakfast.      ergocalciferol (VITAMIN D2) 50,000 unit  Cap Take 50,000 Units by mouth every 30 days.      glipiZIDE (GLUCOTROL) 10 MG TR24 Take 10 mg by mouth daily with breakfast.      hydrochlorothiazide (HYDRODIURIL) 25 MG tablet Take 25 mg by mouth once daily.       labetalol (NORMODYNE) 300 MG tablet Take 300 mg by mouth 2 (two) times daily.      nifedipine (PROCARDIA XL) 60 MG (OSM) 24 hr tablet Take 60 mg by mouth 2 (two) times daily.      simvastatin (ZOCOR) 40 MG tablet Take 40 mg by mouth every evening.             Discharge Procedure Orders  Activity as tolerated     Lifting restrictions   Order Comments: No heavy lifting for 72 hours.     Call MD for:  temperature >100.4     Call MD for:  severe uncontrolled pain     Call MD for:  redness, tenderness, or signs of infection (pain, swelling, redness, odor or green/yellow discharge around incision site)       Follow-up Information     [facility] In 1 day.    Why:  for dialysis  Contact information:  [address]  [facility] [address] [phone]

## 2018-07-20 ENCOUNTER — TELEPHONE (OUTPATIENT)
Dept: WOUND CARE | Facility: CLINIC | Age: 83
End: 2018-07-20

## 2018-07-20 NOTE — TELEPHONE ENCOUNTER
----- Message from Naina Oneal NP sent at 7/20/2018  9:40 AM CDT -----  Contact: Mr Anup Pimentel/   son [phone]  I will forward this to my nurse to schedule.    Thanks!    ----- Message -----  From: Emily Menezes  Sent: 7/20/2018   9:08 AM  To: [facility] Wound Clinical Support    Mr Pimentel states patient is a diabetic has has sore on left leg.   Patient need appointment for Tuesday or Thursday   Please call Mr Pimentel [phone] for more info

## (undated) DEVICE — GLOVE BIOGEL SKINSENSE PI 8.0

## (undated) DEVICE — SEE MEDLINE ITEM 157196

## (undated) DEVICE — BOOT SUTURE AID

## (undated) DEVICE — SPONGE LAP 18X18 PREWASHED

## (undated) DEVICE — SUT VICRYL 2-0 36 CT-1

## (undated) DEVICE — CANNULA VSL W/DUCKBILL

## (undated) DEVICE — SYR SLIP TIP 1CC

## (undated) DEVICE — NDL HYPO REG 25G X 1 1/2

## (undated) DEVICE — SYR 30CC LUER LOCK

## (undated) DEVICE — CLOSURE SKIN STERI STRIP 1/2X4

## (undated) DEVICE — ELECTRODE REM PLYHSV RETURN 9

## (undated) DEVICE — JELLY KY LUBRICATING 5G PACKET

## (undated) DEVICE — POWDER ARISTA AH 3G

## (undated) DEVICE — APPLIER CLIP LIAGCLIP 9.375IN

## (undated) DEVICE — PACK UPPER EXTREMITY BAPTIST

## (undated) DEVICE — APPLIER LIGACLIP MED 11IN

## (undated) DEVICE — LOOP VESSEL BLUE MAXI

## (undated) DEVICE — SUT VICRYL PLUS 3-0 18IN

## (undated) DEVICE — GOWN SMART IMP BREATHABLE XXLG

## (undated) DEVICE — HEMOSTAT SURGICEL 4X8IN

## (undated) DEVICE — SYR SAFETY 3CC 22G X 1.5 ECLIP

## (undated) DEVICE — GLOVE BIOGEL SKINSENSE PI 6.0

## (undated) DEVICE — SYR 10CC LUER LOCK

## (undated) DEVICE — PAD UNDERPAD 30X30

## (undated) DEVICE — SEE MEDLINE ITEM 153151

## (undated) DEVICE — ADHESIVE DERMABOND ADVANCED

## (undated) DEVICE — CATH FOGARTY EMB 4FR 80CM

## (undated) DEVICE — BLADE SURG STAINLESS STEEL #11

## (undated) DEVICE — SPONGE DERMACEA GAUZE 4X4

## (undated) DEVICE — DEV-O-LOOPS MAXI RED

## (undated) DEVICE — SEE MEDLINE ITEM 152622

## (undated) DEVICE — SEE MEDLINE ITEM 146308

## (undated) DEVICE — SYS PRINEO SKIN CLOSURE

## (undated) DEVICE — SUT MCRYL PLUS 4-0 PS2 27IN

## (undated) DEVICE — SUT VICRYL PLUS 3-0 SH 18IN

## (undated) DEVICE — APPLICATOR CHLORAPREP ORN 26ML

## (undated) DEVICE — SET DECANTER MEDICHOICE

## (undated) DEVICE — SEALER LIGASURE CRV 18.8CM

## (undated) DEVICE — GLOVE BIOGEL SKINSENSE PI 6.5

## (undated) DEVICE — SUT POLY CV-6 30 TT-13

## (undated) DEVICE — GAUZE SPONGE 4X4 12PLY

## (undated) DEVICE — SOL NS 1000CC